# Patient Record
(demographics unavailable — no encounter records)

---

## 2017-10-12 NOTE — MRI
MRI CERVICAL SPINE:

 

HISTORY: 

Cervical spondylosis, M47.12.

 

FINDINGS: 

Multiplanar, multisequence, noncontrast-enhanced MRI images cervical spine obtained.

 

The spinal cord demonstrates no significant evidence of cord masses.  Areas of cord compression are 
seen.  Please see below dictation.

 

C1-2:  Unremarkable.

 

C2-3:  Unremarkable.

 

C3-4:  There is a broad-based disk-osteophyte complex centrally compressing the thecal sac resulting
 in a moderate degree of central spinal stenosis.  Moderate to severe right and moderate left-sided 
C3-4 neural foraminal narrowing is seen due to uncovertebral osteophyte hypertrophy.

 

C4-5:  Disk-osteophyte complex is seen centrally compressing the thecal sac resulting in mild to mod
erate central spinal stenosis.  Moderate bilateral neural foraminal narrowing is seen due to uncover
tebral osteophyte hypertrophy.

 

C5-6:  Disk desiccation is seen.  There is a broad-based disk-osteophyte complex centrally compressi
ng the thecal sac resulting in a moderate degree of central and mild lateral recess stenosis.  Mild 
to moderate right and mild left-sided neural foraminal narrowing is seen due to uncovertebral osteop
hyte hypertrophy.

 

C6-7:  Unremarkable.

 

C7-T1:  Unremarkable.

 

IMPRESSION: 

Midcervical changes of spondylosis.  Most significant degree of stenosis is at the C3-4 level.  No s
ignificant evident of signal abnormality is seen within the spinal cord.

 

POS: [facility]

## 2017-12-18 NOTE — OP
DATE OF PROCEDURE:  12/18/2017

 

SURGEON:  Matthew Houston M.D.

 

ASSISTANT:  ALFIE Brady

 

PROCEDURES:  Anterior cervical diskectomy C3 through C6, interbody arthrodesis, intravertebral biomec
hanical device, local morselized autograft, demineralized bone matrix, titanium instrumentation C3-C6
.

 

DESCRIPTION OF PROCEDURE:  The patient was brought into the operating room and intubated.  He was pos
itioned supine with the head in modest extension on a gel-filled donut.  An incision made in the righ
t precervical area and dissecting medial to the sternocleidomastoid muscle.  We identified the anteri
or cervical spine and our level was confirmed by x-ray.  We debrided anterior osteophytes, placed dis
traction across the disc spaces, and using the operating microscope and microdissection techniques, c
ompletely removed the intravertebral discs down to the level of the dura at all affected levels.  Aft
er complete decompression was secured, the bony endplates were decorticated for the purpose of arthro
desis and appropriately sized intravertebral biomechanical PEEK device was brought into the field, fi
lled with demineralized bone matrix and local morselized autograft, and tapped into place securely at
 C3-4, C4-5 and C5-6.  Next, an anterior plate was brought in the field and secured to C3, C4, C5, an
d C6 using two 14 mm screws at each level.  The wound was then extensively irrigated, immaculate hemo
stasis was secured, and the wound was closed in anatomic layers over a drain.

## 2017-12-18 NOTE — PDOC.PN
- Subjective


Encounter Start Date: 12/18/17


Encounter Start Time: 07:00


Subjective: no sob. Moves all extremities


-: no chest pain. No pain





- Objective


MAR Reviewed: Yes


Vital Signs & Weight: 


 Vital Signs (12 hours)











  Temp Pulse Resp BP Pulse Ox


 


 12/18/17 18:00  98.9 F  104 H  16  164/82 H  93 L


 


 12/18/17 17:30  99 F  101 H  16  143/80 H  94 L


 


 12/18/17 17:15  99.1 F  108 H  16  147/77 H  94 L


 


 12/18/17 17:00  99 F  101 H  16   94 L


 


 12/18/17 16:39  99.3 F  108 H  16  156/84 H  95








 Weight











Weight                         201 lb














Result Diagrams: 


 12/18/17 10:52





 12/18/17 10:52





Phys Exam





- Physical Examination


HEENT: PERRLA, moist MMs


Neck: no JVD


surgical incision anterolat neck with eliane drain+


Respiratory: no wheezing, no rales


Cardiovascular: RRR, no significant murmur


Gastrointestinal: soft, non-tender, positive bowel sounds


Musculoskeletal: pulses present


Neurological: non-focal, moves all 4 limbs


Psychiatric: A&O x 3





Dx/Plan


(1) Dyslipidemia


Code(s): E78.5 - HYPERLIPIDEMIA, UNSPECIFIED   Status: Chronic   





(2) GERD (gastroesophageal reflux disease)


Code(s): K21.9 - GASTRO-ESOPHAGEAL REFLUX DISEASE WITHOUT ESOPHAGITIS   Status: 

Chronic   


Qualifiers: 


   Esophagitis presence: esophagitis presence not specified   Qualified Code(s)

: K21.9 - Gastro-esophageal reflux disease without esophagitis   





(3) H/O prostate cancer


Code(s): Z85.46 - PERSONAL HISTORY OF MALIGNANT NEOPLASM OF PROSTATE   Status: 

Chronic   





(4) S/P cervical discectomy


Code(s): Z98.890 - OTHER SPECIFIED POSTPROCEDURAL STATES   Status: Acute   





- Plan


oral diet as tolerated


-: morphine, narco prn


-: is on mevacor for dyslip


-: ativan hs for insomnia


-: nebs prn, i.spirometry





* .








Review of Systems





- Medications/Allergies


Allergies/Adverse Reactions: 


 Allergies











Allergy/AdvReac Type Severity Reaction Status Date / Time


 


ampicillin AdvReac   Verified 12/18/17 17:08


 


aspirin AdvReac   Verified 12/18/17 17:08


 


Penicillins AdvReac   Verified 12/18/17 17:08


 


tetracycline AdvReac   Verified 12/18/17 17:08











Medications: 


 Current Medications





Hydrocodone Bitart/Acetaminophen (Norco 10/325)  1 tab PO Q4H PRN


   PRN Reason: PAIN (1-3)


Hydrocodone Bitart/Acetaminophen (Norco 10/325)  2 tab PO Q4H PRN


   PRN Reason: PAIN (4-6)


Al Hydroxide/Mg Hydroxide (Maalox)  30 ml PO Q4H PRN


   PRN Reason: Heartburn  or Indigestion


Diphenhydramine HCl (Benadryl)  25 mg PO Q6H PRN


   PRN Reason: Itching


Diphenhydramine HCl (Benadryl)  25 mg IVP Q6H PRN


   PRN Reason: Itching


Sodium Chloride (Normal Saline 0.9%)  1,000 mls @ 75 mls/hr IV .[id number] [facility]


   Last Admin: 12/18/17 19:19 Dose:  1,000 mls


Clindamycin Phosphate/Dextrose (900 mg/ Device)  50 mls @ 100 mls/hr IVPB 0400,

1200,2000 [facility]


Lorazepam (Ativan)  0.5 mg PO HS [facility]


Lovastatin (Mevacor)  10 mg PO QPM-WM [facility]


Magnesium Hydroxide (Milk Of Magnesium)  30 ml PO Q12H PRN


   PRN Reason: Constipation


Morphine Sulfate (Morphine)  2 mg SLOW IVP Q1H PRN


   PRN Reason: Moderate Breakthrough Pain


Morphine Sulfate (Morphine)  4 mg SLOW IVP Q1H PRN


   PRN Reason: Severe Breakthrough Pain


Multivitamins (Theragran)  1 tab PO DAILY [facility]


Ondansetron HCl (Zofran)  4 mg IVP Q24H PRN


   PRN Reason: Nausea/Vomiting


Pantoprazole Sodium (Protonix)  40 mg PO QAM-WM [facility]


Glucosamine Complex Patient's Home Medication  1 each PO QAM [facility]


Promethazine HCl (Phenergan)  12.5 mg IM Q4H PRN


   PRN Reason: Nausea/Vomiting


Promethazine HCl (Phenergan)  12.5 mg PO Q4H PRN


   PRN Reason: Nausea/Vomiting


Promethazine HCl (Phenergan Suppository)  12.5 mg OR Q4H PRN


   PRN Reason: Nausea/Vomiting


Sodium Chloride (Flush - Normal Saline)  10 ml IVF Q12HR [facility]


Sodium Chloride (Flush - Normal Saline)  10 ml IVF PRN PRN


   PRN Reason: Saline Flush


Tizanidine HCl (Zanaflex)  4 mg PO Q6H PRN


   PRN Reason: MUSCLE SPASM


Tramadol HCl (Ultram)  50 mg PO Q6H PRN


   PRN Reason: PAIN (1-3)


Tramadol HCl (Ultram)  100 mg PO Q6H PRN


   PRN Reason: PAIN (4-6)

## 2017-12-19 NOTE — PDOC.PN
- Subjective


Encounter Start Date: 12/19/17


Encounter Start Time: 13:59





Mr. Altamirano was seen today in follow-up. He noted some pain in his left shoulder

,but otherwise ok. He denies chest pain or shortness of breath. 





- Objective


MAR Reviewed: Yes


Vital Signs & Weight: 


 Vital Signs (12 hours)











  Temp Pulse Pulse Pulse Pulse Resp BP


 


 12/19/17 11:35  98.0 F  108 H     16 


 


 12/19/17 08:00  99.2 F  119 H     18 


 


 12/19/17 07:48    105 H  110 H  117 H   131/82


 


 12/19/17 07:45  99.2 F  119 H     


 


 12/19/17 04:00  99.2 F  115 H     20 














  BP BP BP Pulse Ox


 


 12/19/17 11:35    105/50 L  96


 


 12/19/17 08:00     96


 


 12/19/17 07:48  138/81  138/77  


 


 12/19/17 07:45    134/78  94 L


 


 12/19/17 04:00    136/74  93 L








 Weight











Weight                         201 lb














I&O: 


 











 12/18/17 12/19/17 12/20/17





 06:59 06:59 06:59


 


Intake Total  1140 


 


Output Total  610 


 


Balance  530 











Result Diagrams: 


 12/18/17 10:52





 12/18/17 10:52





Phys Exam





- Physical Examination


HEENT: PERRLA


+ bilateral rhonchi, no rales


Cardiovascular: RRR, no significant murmur


Gastrointestinal: soft, non-tender, positive bowel sounds


Musculoskeletal: no edema





Dx/Plan


(1) S/P cervical discectomy


Code(s): Z98.890 - OTHER SPECIFIED POSTPROCEDURAL STATES   Status: Acute   





(2) Dyslipidemia


Code(s): E78.5 - HYPERLIPIDEMIA, UNSPECIFIED   Status: Chronic   





(3) GERD (gastroesophageal reflux disease)


Code(s): K21.9 - GASTRO-ESOPHAGEAL REFLUX DISEASE WITHOUT ESOPHAGITIS   Status: 

Chronic   


Qualifiers: 


   Esophagitis presence: esophagitis presence not specified   Qualified Code(s)

: K21.9 - Gastro-esophageal reflux disease without esophagitis   





- Plan





* Patient is s/p ACD- clinically stable


* I have encouraged Incentive spirometry


* Blood pressure is low normal


* Continue as per Neurosurgery


.

## 2017-12-20 NOTE — PDOC.PN
- Subjective


Encounter Start Date: 12/20/17


Encounter Start Time: 17:12





Mr. Altamirano was seen in follow-up today. He is post ACD. He says he is having a 

lot of difficulty swallowing, even liquids. He also has felt a bit weaker than 

yesterday. His nurse also called due to elevated blood pressure.





- Objective


MAR Reviewed: Yes


Vital Signs & Weight: 


 Vital Signs (12 hours)











  Temp Pulse Resp BP BP Pulse Ox


 


 12/20/17 16:51   111 H   178/92 H  


 


 12/20/17 16:20       94 L


 


 12/20/17 16:15  100.6 F H  108 H  20   184/104 H  94 L


 


 12/20/17 11:25  98.9 F  87  20   172/89 H  93 L


 


 12/20/17 07:40  99.0 F  100  16   152/87 H  92 L


 


 12/20/17 07:35  99.0 F  100  16    92 L








 Weight











Weight                         201 lb














I&O: 


 











 12/19/17 12/20/17 12/21/17





 06:59 06:59 06:59


 


Intake Total 1140 5680 


 


Output Total 610 615 


 


Balance 530 5065 











Result Diagrams: 


 12/18/17 10:52





 12/18/17 10:52





Phys Exam





- Physical Examination


HEENT: PERRLA


Respiratory: no wheezing, no rales, no rhonchi, clear to auscultation bilateral


Cardiovascular: RRR, no significant murmur


Gastrointestinal: soft, non-tender, positive bowel sounds


Musculoskeletal: no edema





Dx/Plan


(1) S/P cervical discectomy


Code(s): Z98.890 - OTHER SPECIFIED POSTPROCEDURAL STATES   Status: Acute   





(2) Dyslipidemia


Code(s): E78.5 - HYPERLIPIDEMIA, UNSPECIFIED   Status: Chronic   





(3) GERD (gastroesophageal reflux disease)


Code(s): K21.9 - GASTRO-ESOPHAGEAL REFLUX DISEASE WITHOUT ESOPHAGITIS   Status: 

Chronic   


Qualifiers: 


   Esophagitis presence: esophagitis presence not specified   Qualified Code(s)

: K21.9 - Gastro-esophageal reflux disease without esophagitis   





- Plan





* Dysphagia- this is an expected effect following this type of surgery. We will 

change him to NPO, and have speech therapy evaluate him in the morning


* Elevated blood pressure without HTN- will treat with Labetalol as needed for 

now, and monitor


* Will change his fluids to D5NS

## 2017-12-21 NOTE — PDOC.PN
- Subjective


Encounter Start Date: 12/21/17


Encounter Start Time: 10:08





Mr. Altamirano was seen today in follow-up of elevated blood pressure and 

dysphagia. He feels he is doing a bit better today. 





- Objective


MAR Reviewed: Yes


Vital Signs & Weight: 


 Vital Signs (12 hours)











  Temp Pulse Resp BP BP Pulse Ox


 


 12/21/17 08:00  99.0 F  97  16   164/95 H  92 L


 


 12/21/17 03:51  97.9 F  95  16  138/76   93 L


 


 12/20/17 23:41  97.5 F L  94  20  144/87 H   94 L








 Weight











Weight                         201 lb














I&O: 


 











 12/20/17 12/21/17 12/22/17





 06:59 06:59 06:59


 


Intake Total 5680 900 


 


Output Total 615 1100 


 


Balance 5065 -200 











Result Diagrams: 


 12/18/17 10:52





 12/18/17 10:52





Phys Exam





- Physical Examination


HEENT: PERRLA


Respiratory: no wheezing, no rales, no rhonchi, clear to auscultation bilateral


Cardiovascular: RRR, no significant murmur


Gastrointestinal: soft, non-tender, positive bowel sounds


Musculoskeletal: no edema





Dx/Plan


(1) S/P cervical discectomy


Code(s): Z98.890 - OTHER SPECIFIED POSTPROCEDURAL STATES   Status: Acute   





(2) Dyslipidemia


Code(s): E78.5 - HYPERLIPIDEMIA, UNSPECIFIED   Status: Chronic   





(3) GERD (gastroesophageal reflux disease)


Code(s): K21.9 - GASTRO-ESOPHAGEAL REFLUX DISEASE WITHOUT ESOPHAGITIS   Status: 

Chronic   


Qualifiers: 


   Esophagitis presence: esophagitis presence not specified   Qualified Code(s)

: K21.9 - Gastro-esophageal reflux disease without esophagitis   





- Plan





* Dysphagia- Discussed with Lluvia Henderson PA-C from Neurosurgery, and will hold 

off on speech therapy consult, and monitor him a day or two more, give a trial 

of a diet today


* Elevated blood pressure- he does not have a history of HTN- will continue to 

monitor, and treat the elevations symptomatically


* PT/OT


*  Disposition as per Neurosurgery.

## 2017-12-22 NOTE — PDOC.PN
- Subjective


Encounter Start Date: 12/22/17


Encounter Start Time: 10:30


Subjective: no trouble swallowing, moving all extremities





- Objective


MAR Reviewed: Yes


Vital Signs & Weight: 


 Vital Signs (12 hours)











  Temp Pulse Resp BP Pulse Ox


 


 12/22/17 08:00  98.8 F  92  16  170/94 H  93 L








 Weight











Weight                         201 lb














I&O: 


 











 12/21/17 12/22/17 12/23/17





 06:59 06:59 06:59


 


Intake Total 900 720 


 


Output Total 1100  


 


Balance -200 720 











Result Diagrams: 


 12/18/17 10:52





 12/18/17 10:52





Phys Exam





- Physical Examination


HEENT: PERRLA, moist MMs


Neck: no JVD


dressing over surgical site


Respiratory: no wheezing, no rales


Cardiovascular: RRR, no significant murmur


Gastrointestinal: soft, non-tender, positive bowel sounds


Musculoskeletal: no edema, pulses present


Neurological: non-focal, moves all 4 limbs


Psychiatric: A&O x 3





Dx/Plan


(1) Dyslipidemia


Code(s): E78.5 - HYPERLIPIDEMIA, UNSPECIFIED   Status: Chronic   





(2) GERD (gastroesophageal reflux disease)


Code(s): K21.9 - GASTRO-ESOPHAGEAL REFLUX DISEASE WITHOUT ESOPHAGITIS   Status: 

Chronic   


Qualifiers: 


   Esophagitis presence: esophagitis presence not specified   Qualified Code(s)

: K21.9 - Gastro-esophageal reflux disease without esophagitis   





(3) H/O prostate cancer


Code(s): Z85.46 - PERSONAL HISTORY OF MALIGNANT NEOPLASM OF PROSTATE   Status: 

Chronic   





(4) S/P cervical discectomy


Code(s): Z98.890 - OTHER SPECIFIED POSTPROCEDURAL STATES   Status: Acute   





- Plan


hemostable


-: awaiting rehab bed


-: may dc anytime if ok with nsx


-: is off decadron, dc iv fluids, tolerating oral diet





* .








Review of Systems





- Medications/Allergies


Allergies/Adverse Reactions: 


 Allergies











Allergy/AdvReac Type Severity Reaction Status Date / Time


 


ampicillin AdvReac   Verified 12/18/17 17:08


 


aspirin AdvReac   Verified 12/18/17 17:08


 


Penicillins AdvReac   Verified 12/18/17 17:08


 


tetracycline AdvReac   Verified 12/18/17 17:08











Medications: 


 Current Medications





Hydrocodone Bitart/Acetaminophen (Norco 10/325)  1 tab PO Q4H PRN


   PRN Reason: PAIN (1-3)


Hydrocodone Bitart/Acetaminophen (Norco 10/325)  2 tab PO Q4H PRN


   PRN Reason: PAIN (4-6)


   Last Admin: 12/19/17 16:59 Dose:  2 tab


Al Hydroxide/Mg Hydroxide (Maalox)  30 ml PO Q4H PRN


   PRN Reason: Heartburn  or Indigestion


Diphenhydramine HCl (Benadryl)  25 mg PO Q6H PRN


   PRN Reason: Itching


Diphenhydramine HCl (Benadryl)  25 mg IVP Q6H PRN


   PRN Reason: Itching


Docusate Sodium (Colace)  100 mg PO BID [facility]


   Last Admin: 12/22/17 08:24 Dose:  100 mg


Potassium Chloride/Dextrose/Sod Cl (D5 0.9% Ns W/ 20 Meq Kcl)  1,000 mls @ 75 

mls/hr IV .[id number] [facility]


   Last Admin: 12/22/17 01:36 Dose:  1,000 mls


Labetalol HCl (Normodyne)  20 mg SLOW IVP Q4H PRN


   PRN Reason: Systolic BP > 180


Lorazepam (Ativan)  0.5 mg PO [facility]


   Last Admin: 12/21/17 20:11 Dose:  0.5 mg


Lovastatin (Mevacor)  10 mg PO QPM-[facility]


   Last Admin: 12/21/17 20:11 Dose:  10 mg


Magnesium Hydroxide (Milk Of Magnesium)  30 ml PO Q12H PRN


   PRN Reason: Constipation


Morphine Sulfate (Morphine)  2 mg SLOW IVP Q1H PRN


   PRN Reason: Moderate Breakthrough Pain


Morphine Sulfate (Morphine)  4 mg SLOW IVP Q1H PRN


   PRN Reason: Severe Breakthrough Pain


   Last Admin: 12/20/17 09:10 Dose:  4 mg


Multivitamins (Theragran)  1 tab PO DAILY [facility]


   Last Admin: 12/22/17 08:24 Dose:  1 tab


Ondansetron HCl (Zofran)  4 mg IVP Q24H PRN


   PRN Reason: Nausea/Vomiting


Pantoprazole Sodium (Protonix)  40 mg PO QAM-[facility]


   Last Admin: 12/22/17 08:24 Dose:  40 mg


Promethazine HCl (Phenergan)  12.5 mg IM Q4H PRN


   PRN Reason: Nausea/Vomiting


Promethazine HCl (Phenergan)  12.5 mg PO Q4H PRN


   PRN Reason: Nausea/Vomiting


Promethazine HCl (Phenergan Suppository)  12.5 mg NV Q4H PRN


   PRN Reason: Nausea/Vomiting


Sodium Chloride (Flush - Normal Saline)  10 ml IVF Q12HR [facility]


   Last Admin: 12/22/17 08:24 Dose:  10 ml


Sodium Chloride (Flush - Normal Saline)  10 ml IVF PRN PRN


   PRN Reason: Saline Flush


Throat Lozenges (Cepastat Lozenges)  1 rakesh PO Q2H PRN


   PRN Reason: Sore Throat


   Last Admin: 12/21/17 11:22 Dose:  1 rakesh


Tizanidine HCl (Zanaflex)  4 mg PO Q6H PRN


   PRN Reason: MUSCLE SPASM


   Last Admin: 12/19/17 09:58 Dose:  4 mg


Tramadol HCl (Ultram)  50 mg PO Q6H PRN


   PRN Reason: PAIN (1-3)


Tramadol HCl (Ultram)  100 mg PO Q6H PRN


   PRN Reason: PAIN (4-6)

## 2017-12-22 NOTE — DIS
DATE OF ADMISSION:  12/18/2017

 

DATE OF DISCHARGE:  12/22/2017

 

HOSPITAL COURSE:  The patient is a 77-year-old  male with a past 
medical history of GERD and hyperlipidemia, who was seen in clinical evaluation 
for progressively worsening cervical myelopathy in the setting of cervical 
stenosis.  The patient underwent C3-C6 ACDF without complications.  The 
following day, the SARA drain had minimal output and was removed.  During his 
admission course, the patient did develop some dysphagia.  This was treated 
with IV Decadron with significant relief.  On postop day #3, the patient was 
able to tolerate soft diet.  He was voiding appropriately.  His walking has 
improved significantly with the assistance of Physical Therapy and he is able 
to walk approximately 100 feet with a walker.  Initially it was recommended 
that patient should go to rehab; however, considering his significant 
improvement following his surgery felt that the patient could safely be 
discharged to home with home health and home health physical therapy.  The 
patient and the family were amenable to this plan.  I provided him a 
prescription with Zanaflex and tramadol, and will plan to see them in follow up 
in the office in approximately 2 weeks.  I discussed incisional care 
precautions and reasons to reach out to us sooner.  Case management is 
arranging home health and physical therapy.  Please return to the Neurosurgery 
Service for additional questions or concerns.

 

KYLEE

## 2017-12-22 NOTE — DIS
DATE OF ADMISSION:  12/18/2017

 

DATE OF DISCHARGE:  12/22/2017

 

DISCHARGE DISPOSITION:  To home with home health.

 

PRIMARY DISCHARGE DIAGNOSIS:  Patient is status post cervical diskectomy.

 

SECONDARY DISCHARGE DIAGNOSES:  Dyslipidemia, gastroesophageal reflux disease, history of prostate ca
ncer.

 

PROCEDURES DONE DURING HOSPITALIZATION:  The patient was electively admitted on 12/18/2017 for anteri
or cervical diskectomy C3-C6 done by Dr. Houston.  H&H is 13 and 41.  BUN and creatinine are 18 and 
1.0.

 

DISCHARGE MEDICATIONS:  Nexium 40 mg p.o. daily, lorazepam 0.5 mg p.o. at bedtime, lovastatin 10 mg p
.o. q.p.m., multivitamin 1 tab once daily.

 

ALLERGIES:  To AMPICILLIN, ASPIRIN, PENICILLIN, TETRACYCLINE.

 

DISCHARGE PLAN:  Patient to follow up with Dr. Houston as advised.

 

BRIEF COURSE DURING HOSPITALIZATION:  Patient initially got electively admitted on the 18th for anter
ior cervical diskectomy C3-C6.  This was done by Dr. Houston.  Postop, [facility] were consult
ed for comanagement of medical issues.  He has remained hemodynamically and neurologically stable.  P
ostop, he has had mild dysphagia and was placed briefly on steroids, which has been discontinued at t
he time of discharge.  He is ambulating and Neurosurgery has planned to discharge him home with home 
health at present.  He needs follow up with Dr. Houston as advised.  Please see a face-to-face docum
entation on [facility] for the day of discharge.